# Patient Record
Sex: MALE | Race: WHITE | NOT HISPANIC OR LATINO | Employment: OTHER | URBAN - METROPOLITAN AREA
[De-identification: names, ages, dates, MRNs, and addresses within clinical notes are randomized per-mention and may not be internally consistent; named-entity substitution may affect disease eponyms.]

---

## 2017-10-19 ENCOUNTER — GENERIC CONVERSION - ENCOUNTER (OUTPATIENT)
Dept: OTHER | Facility: OTHER | Age: 67
End: 2017-10-19

## 2018-10-24 ENCOUNTER — OFFICE VISIT (OUTPATIENT)
Dept: DERMATOLOGY | Facility: CLINIC | Age: 68
End: 2018-10-24
Payer: COMMERCIAL

## 2018-10-24 DIAGNOSIS — Z13.89 SCREENING FOR SKIN CONDITION: ICD-10-CM

## 2018-10-24 DIAGNOSIS — L72.9 FOLLICULAR CYST OF SKIN: ICD-10-CM

## 2018-10-24 DIAGNOSIS — L40.9 PSORIASIS: ICD-10-CM

## 2018-10-24 DIAGNOSIS — L82.1 SEBORRHEIC KERATOSIS: Primary | ICD-10-CM

## 2018-10-24 DIAGNOSIS — Z85.820 HISTORY OF MELANOMA: Chronic | ICD-10-CM

## 2018-10-24 PROCEDURE — 99213 OFFICE O/P EST LOW 20 MIN: CPT | Performed by: DERMATOLOGY

## 2018-10-24 RX ORDER — SIMVASTATIN 10 MG
TABLET ORAL
COMMUNITY

## 2018-10-24 RX ORDER — ALCLOMETASONE DIPROPIONATE 0.5 MG/G
CREAM TOPICAL
Refills: 0 | COMMUNITY
Start: 2018-10-11 | End: 2018-10-24 | Stop reason: SDUPTHER

## 2018-10-24 RX ORDER — ALCLOMETASONE DIPROPIONATE 0.5 MG/G
CREAM TOPICAL 2 TIMES DAILY
Qty: 30 G | Refills: 1 | Status: SHIPPED | OUTPATIENT
Start: 2018-10-24 | End: 2019-10-29 | Stop reason: SDUPTHER

## 2018-10-24 RX ORDER — MULTIVIT-MIN/IRON FUM/FOLIC AC 7.5 MG-4
TABLET ORAL
COMMUNITY

## 2018-10-24 RX ORDER — TAMSULOSIN HYDROCHLORIDE 0.4 MG/1
0.8 CAPSULE ORAL DAILY
Refills: 0 | COMMUNITY
Start: 2018-09-20

## 2018-10-24 NOTE — PROGRESS NOTES
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 and treated with 2 excisions finally no problems noted over many years psoriasis noted previously treated with Aclovate with good control  In addition now patient complains of a lump on his back no other concerns noted  Past Medical History:   Diagnosis Date    Prostate CA ([facility] [address] )      No past surgical history on file  Social History   History   Alcohol use Not on file     History   Drug use: Unknown     History   Smoking Status    Former Smoker   Smokeless Tobacco    Never Used     No family history on file  Meds/Allergies   Allergies   Allergen Reactions    Penicillins        Meds:  Prior to Admission medications    Medication Sig Start Date End Date Taking?  Authorizing Provider   alclomethasone (ACLOVATE) 0 05 % cream  10/11/18  Yes Historical Provider, MD   Multiple Vitamins-Minerals (MULTIVITAMIN WITH MINERALS) tablet Take by mouth   Yes Historical Provider, MD   simvastatin (ZOCOR) 10 mg tablet Take by mouth   Yes Historical Provider, MD   tamsulosin (FLOMAX) 0 4 mg Take 0 8 mg by mouth daily 18  Yes Historical Provider, MD       Subjective:     Review of Systems:    General: negative for - chills, fatigue, fever,  weight gain or weight loss  Psychological: negative for - anxiety, behavioral disorder, concentration difficulties, decreased libido, depression, irritability, memory difficulties, mood swings, sleep disturbances or suicidal ideation  ENT: negative for - hearing difficulties , nasal congestion, nasal discharge, oral lesions, sinus pain, sneezing, sore throat  Allergy and Immunology: negative for - hives, insect bite sensitivity,  Hematological and Lymphatic: negative for - bleeding problems, blood clots,bruising, swollen lymph nodes  Endocrine: negative for - hair pattern changes, hot flashes, malaise/lethargy, mood swings, palpitations, polydipsia/polyuria, skin changes, temperature intolerance or unexpected weight change  Respiratory: negative for - cough, hemoptysis, orthopnea, shortness of breath, or wheezing  Cardiovascular: negative for - chest pain, dyspnea on exertion, edema,  Gastrointestinal: negative for - abdominal pain, nausea/vomiting  Genito-Urinary: negative for - dysuria, incontinence, irregular/heavy menses or urinary frequency/urgency  Musculoskeletal: negative for - gait disturbance, joint pain, joint stiffness, joint swelling, muscle pain, muscular weakness  Dermatological:  As in HPI  Neurological: negative for confusion, dizziness, headaches, impaired coordination/balance, memory loss, numbness/tingling, seizures, speech problems, tremors or weakness       Objective: There were no vitals taken for this visit  Physical Exam:    General Appearance:    Alert, cooperative, no distress   Head:    Normocephalic, without obvious abnormality, atraumatic           Skin:   A full skin exam was performed including scalp, head scalp, eyes, ears, nose, lips, neck, chest, axilla, abdomen, back, buttocks, bilateral upper extremities, bilateral lower extremities, hands, feet, fingers, toes, fingernails, and toenails previous site of skin cancer well healed on cheek without recurrence normal pigmented lesions regular shape and color normal keratotic papules greasy stuck on appearance 2 cm cystic nodule noted on the mid back erythematous scaling patches noted in the meatus of the ears eyebrows and a bit on the scalp nothing else remarkable noted on exam     Assessment:     1  Seborrheic keratosis     2  History of melanoma      lentigo maligna L cheek 2003-2007recurrence   3  Screening for skin condition     4  Psoriasis  alclomethasone (ACLOVATE) 0 05 % cream   5  Follicular cyst of skin           Plan:   Psoriasis under good control continue same therapy  Follicular cyst advised patient that this is from hair follicles that ballooned out and forms this type of growth  No treatment indicated unless patient so desires or if lesion enlarges or changes  Seborrheic keratosis patient reassured these are normal growths we acquire with age no treatment needed  History of melanoma no recurrence nothing else atypical sunblock recommended follow-up in 1 year  Screening for dermatologic disorders nothing else of concern noted on complete exam follow-up in 1 year      Gwenette Gosselin, MD  10/24/2018,10:15 AM    Portions of the record may have been created with voice recognition software   Occasional wrong word or "sound a like" substitutions may have occurred due to the inherent limitations of voice recognition software   Read the chart carefully and recognize, using context, where substitutions have occurred

## 2018-10-24 NOTE — PATIENT INSTRUCTIONS
Psoriasis under good control continue same therapy  Follicular cyst advised patient that this is from hair follicles that ballooned out and forms this type of growth  No treatment indicated unless patient so desires or if lesion enlarges or changes    Seborrheic keratosis patient reassured these are normal growths we acquire with age no treatment needed  History of melanoma no recurrence nothing else atypical sunblock recommended follow-up in 1 year  Screening for dermatologic disorders nothing else of concern noted on complete exam follow-up in 1 year

## 2019-10-29 ENCOUNTER — OFFICE VISIT (OUTPATIENT)
Dept: DERMATOLOGY | Facility: CLINIC | Age: 69
End: 2019-10-29
Payer: COMMERCIAL

## 2019-10-29 DIAGNOSIS — L72.9 FOLLICULAR CYST OF SKIN: ICD-10-CM

## 2019-10-29 DIAGNOSIS — Z13.89 SCREENING FOR SKIN CONDITION: ICD-10-CM

## 2019-10-29 DIAGNOSIS — L82.1 SEBORRHEIC KERATOSIS: Primary | ICD-10-CM

## 2019-10-29 DIAGNOSIS — Z85.820 HISTORY OF MELANOMA: ICD-10-CM

## 2019-10-29 DIAGNOSIS — L40.9 PSORIASIS: ICD-10-CM

## 2019-10-29 PROCEDURE — 99213 OFFICE O/P EST LOW 20 MIN: CPT | Performed by: DERMATOLOGY

## 2019-10-29 RX ORDER — ALCLOMETASONE DIPROPIONATE 0.5 MG/G
CREAM TOPICAL 2 TIMES DAILY
Qty: 30 G | Refills: 1 | Status: SHIPPED | OUTPATIENT
Start: 2019-10-29 | End: 2020-11-04 | Stop reason: SDUPTHER

## 2019-10-29 NOTE — PROGRESS NOTES
[address] [facility] DERMATOLOGY  [address] [place] [address]  [phone]  [phone]     MRN: [id number] : 1950  Encounter: [id number]  Patient Information: Donia Buerger  Chief complaint:  Yearly checkup    History of present illness:  26-year-old male with previous history of both melanoma nonmelanoma skin cancer and history of psoriasis presents for overall checkup  Patient also concerned regarding cyst on his back  Past Medical History:   Diagnosis Date    Prostate CA [facility])      History reviewed  No pertinent surgical history  Social History   Social History     Substance and Sexual Activity   Alcohol Use Not on file     Social History     Substance and Sexual Activity   Drug Use Not on file     Social History     Tobacco Use   Smoking Status Former Smoker   Smokeless Tobacco Never Used     History reviewed  No pertinent family history  Meds/Allergies   Allergies   Allergen Reactions    Bee Venom Anaphylaxis    Penicillins        Meds:  Prior to Admission medications    Medication Sig Start Date End Date Taking?  Authorizing Provider   alclomethasone (ACLOVATE) 0 05 % cream Apply topically 2 (two) times a day 10/24/18  Yes Laila Lopez MD   Multiple Vitamins-Minerals (MULTIVITAMIN WITH MINERALS) tablet Take by mouth   Yes Historical Provider, MD   simvastatin (ZOCOR) 10 mg tablet Take by mouth   Yes Historical Provider, MD   tamsulosin (FLOMAX) 0 4 mg Take 0 8 mg by mouth daily 18  Yes Historical Provider, MD       Subjective:     Review of Systems:    General: negative for - chills, fatigue, fever,  weight gain or weight loss  Psychological: negative for - anxiety, behavioral disorder, concentration difficulties, decreased libido, depression, irritability, memory difficulties, mood swings, sleep disturbances or suicidal ideation  ENT: negative for - hearing difficulties , nasal congestion, nasal discharge, oral lesions, sinus pain, sneezing, sore throat  Allergy and Immunology: negative for - hives, insect bite sensitivity,  Hematological and Lymphatic: negative for - bleeding problems, blood clots,bruising, swollen lymph nodes  Endocrine: negative for - hair pattern changes, hot flashes, malaise/lethargy, mood swings, palpitations, polydipsia/polyuria, skin changes, temperature intolerance or unexpected weight change  Respiratory: negative for - cough, hemoptysis, orthopnea, shortness of breath, or wheezing  Cardiovascular: negative for - chest pain, dyspnea on exertion, edema,  Gastrointestinal: negative for - abdominal pain, nausea/vomiting  Genito-Urinary: negative for - dysuria, incontinence, irregular/heavy menses or urinary frequency/urgency  Musculoskeletal: negative for - gait disturbance, joint pain, joint stiffness, joint swelling, muscle pain, muscular weakness  Dermatological:  As in HPI  Neurological: negative for confusion, dizziness, headaches, impaired coordination/balance, memory loss, numbness/tingling, seizures, speech problems, tremors or weakness       Objective: There were no vitals taken for this visit  Physical Exam:    General Appearance:    Alert, cooperative, no distress   Head:    Normocephalic, without obvious abnormality, atraumatic           Skin:   A full skin exam was performed including scalp, head scalp, eyes, ears, nose, lips, neck, chest, axilla, abdomen, back, buttocks, bilateral upper extremities, bilateral lower extremities, hands, feet, fingers, toes, fingernails, and toenails previous sites of skin cancer well healed without recurrence normal pigmented lesions with regular shape and color normal keratotic papules with greasy stuck on appearance 13 mm cystic nodule noted on the mid back nothing else remarkable noted on complete exam scaling erythematous patches noted on the forehead and left parietal scalp     Assessment:     1  Seborrheic keratosis     2  Screening for skin condition     3   History of melanoma     4  Psoriasis  alclomethasone (ACLOVATE) 0 05 % cream   5  Follicular cyst of skin           Plan: Follicular cyst advised patient that this is from hair follicles that ballooned out and forms this type of growth  No treatment indicated unless patient so desires or if lesion enlarges or changes  Psoriasis under control continue same treatment  History of melanoma no recurrence nothing else atypical continue to monitor closely if any new lesions  Seborrheic Keratosis  Patient reasurred these are normal growths we acquire with age no treatment needed  Screening for Dermatologic Disorders: Nothing else of concern noted on complete exam follow up in 1 year       Padmini Hoffman MD  10/29/2019,9:32 AM    Portions of the record may have been created with voice recognition software   Occasional wrong word or "sound a like" substitutions may have occurred due to the inherent limitations of voice recognition software   Read the chart carefully and recognize, using context, where substitutions have occurred

## 2019-10-29 NOTE — PATIENT INSTRUCTIONS
Follicular cyst advised patient that this is from hair follicles that ballooned out and forms this type of growth  No treatment indicated unless patient so desires or if lesion enlarges or changes  Psoriasis under control continue same treatment  History of melanoma no recurrence nothing else atypical continue to monitor closely if any new lesions  Seborrheic Keratosis  Patient reasurred these are normal growths we acquire with age no treatment needed    Screening for Dermatologic Disorders: Nothing else of concern noted on complete exam follow up in 1 year

## 2019-12-04 ENCOUNTER — PROCEDURE VISIT (OUTPATIENT)
Dept: DERMATOLOGY | Facility: CLINIC | Age: 69
End: 2019-12-04
Payer: COMMERCIAL

## 2019-12-04 DIAGNOSIS — L72.9 FOLLICULAR CYST OF SKIN: Primary | ICD-10-CM

## 2019-12-04 PROCEDURE — 88304 TISSUE EXAM BY PATHOLOGIST: CPT | Performed by: STUDENT IN AN ORGANIZED HEALTH CARE EDUCATION/TRAINING PROGRAM

## 2019-12-04 PROCEDURE — 12031 INTMD RPR S/A/T/EXT 2.5 CM/<: CPT | Performed by: DERMATOLOGY

## 2019-12-04 PROCEDURE — 11402 EXC TR-EXT B9+MARG 1.1-2 CM: CPT | Performed by: DERMATOLOGY

## 2019-12-04 NOTE — PROGRESS NOTES
[address]  [address] [facility] [address]  [phone]  [phone]     MRN: [id number] : 1950  Encounter: [id number]  Patient Information: Heidi Schaffer    Subjective:     66-year-old male presents for follow-up secondary to planned excision of the cyst noted on the mid lower back     Objective: There were no vitals taken for this visit  Physical Exam:    General Appearance:    Alert, cooperative, no distress   Skin:   Previously noted 13 mm cyst mid lower back  Procedure name:  Simple Excision with primary closure of cyst        Location:  Mid lower back    Diagnosis:  Follicular cyst    Size of lesion: 1 3cm      I explained the diagnosis to the patient and we recommend an excision of the lesion for diagnosis and/or treatment  Potential complications include, but are not limited to: scarring, bleeding, infection, incomplete removal, recurrence, and nerve damage  The risks, benefits, and alternatives were discussed with the patient in detail  The location of the tumor was identified, circled, and confirmed by the patient  The correct patient, site, and procedure were confirmed  Anesthesia: 1% xylocaine with 1:100,000 epinephrine 3  cc  The patient was brought into the room, prepped and draped sterilely in the usual manner and anesthesia was administered by local infiltration  A linear incision was drawn across the lesion, and the area was incised to the cyst wall with a number 15c Bard-Familia blade  The cyst was removed with sharp and blunt dissection  Hemostasis was achieved with cautery  A primary closure was obtained The wound was cleaned with hydrogen peroxide, dried off, petroleum applied, and the wound was covered with a pressure dressing  The patient was given detailed verbal and written instructions on postoperative care        Suture for adipose/fascial/dermal layer closure: 4-0  Vicryl 2 sutures    Suture used to approximate epidermis: 4-0  nylon 3 sutures    Final wound length: 1 3 cm    Follow-up in: 2 weeks  Assessment:     1  Follicular cyst of skin           Plan:   Wound care instructions given to patient        Prior to Admission medications    Medication Sig Start Date End Date Taking? Authorizing Provider   alclomethasone (ACLOVATE) 0 05 % cream Apply topically 2 (two) times a day 10/29/19  Yes Irene Sinclair MD   Multiple Vitamins-Minerals (MULTIVITAMIN WITH MINERALS) tablet Take by mouth   Yes Historical Provider, MD   simvastatin (ZOCOR) 10 mg tablet Take by mouth   Yes Historical Provider, MD   tamsulosin (FLOMAX) 0 4 mg Take 0 8 mg by mouth daily 9/20/18  Yes Historical Provider, MD     Allergies   Allergen Reactions    Bee Venom Anaphylaxis    Penicillins        Irene Sinclair MD  12/4/2019,10:09 AM    Portions of the record may have been created with voice recognition software   Occasional wrong word or "sound a like" substitutions may have occurred due to the inherent limitations of voice recognition software   Read the chart carefully and recognize, using context, where substitutions have occurred

## 2019-12-16 ENCOUNTER — TELEPHONE (OUTPATIENT)
Dept: DERMATOLOGY | Facility: CLINIC | Age: 69
End: 2019-12-16

## 2019-12-16 NOTE — TELEPHONE ENCOUNTER
----- Message from Irene Sinclair MD sent at 12/13/2019  8:01 AM EST -----  Please call him of normal pathology

## 2019-12-18 ENCOUNTER — OFFICE VISIT (OUTPATIENT)
Dept: DERMATOLOGY | Facility: CLINIC | Age: 69
End: 2019-12-18

## 2019-12-18 DIAGNOSIS — L72.9 FOLLICULAR CYST OF SKIN: Primary | ICD-10-CM

## 2019-12-18 PROCEDURE — 99024 POSTOP FOLLOW-UP VISIT: CPT | Performed by: DERMATOLOGY

## 2019-12-18 NOTE — PROGRESS NOTES
[address]  [address] [facility] [address]  [phone]  [phone]     MRN: [id number] : 1950  Encounter: [id number]  Patient Information: Dorien Eisenmenger    Subjective:     64-year-old male presents for follow-up for previously excised follicular cyst mid lower back no problems noted     Objective: There were no vitals taken for this visit  Physical Exam:    General Appearance:    Alert, cooperative, no distress   Skin:   Previous site of excision healing well  Procedure:  Suture removal  Site of excision:  Lower back  Wound was cleansed with saline  Wound is intact  Sutures removed  Steri-Strips applied  Biopsy revealed normal cyst     Assessment:     1  Follicular cyst of skin           Plan:   Wound care instructions given to patient        Prior to Admission medications    Medication Sig Start Date End Date Taking? Authorizing Provider   alclomethasone (ACLOVATE) 0 05 % cream Apply topically 2 (two) times a day 10/29/19   Marlee Kapadia MD   Multiple Vitamins-Minerals (MULTIVITAMIN WITH MINERALS) tablet Take by mouth    Historical Provider, MD   simvastatin (ZOCOR) 10 mg tablet Take by mouth    Historical Provider, MD   tamsulosin (FLOMAX) 0 4 mg Take 0 8 mg by mouth daily 18   Historical Provider, MD     Allergies   Allergen Reactions    Bee Venom Anaphylaxis    Penicillins        Marlee Kapadia MD  2019,8:29 AM    Portions of the record may have been created with voice recognition software   Occasional wrong word or "sound a like" substitutions may have occurred due to the inherent limitations of voice recognition software   Read the chart carefully and recognize, using context, where substitutions have occurred

## 2019-12-18 NOTE — PATIENT INSTRUCTIONS
Wound care instructions given to patient  STERI-STRIPS (BUTTERFLY) POST SURGERY        Steri-Strips have been placed over your incision site to give added support  Please continue to bathe the area as usual     Eventually the Steri-Strips will begin to peel off on the ends  Snip the raised ends off with scissors and let the rest fall off on their own  If you have any questions, please call our office   [phone]

## 2020-11-04 ENCOUNTER — OFFICE VISIT (OUTPATIENT)
Dept: DERMATOLOGY | Facility: CLINIC | Age: 70
End: 2020-11-04
Payer: COMMERCIAL

## 2020-11-04 VITALS — TEMPERATURE: 98.6 F

## 2020-11-04 DIAGNOSIS — L40.9 PSORIASIS: Primary | ICD-10-CM

## 2020-11-04 DIAGNOSIS — Z85.820 HISTORY OF MELANOMA: ICD-10-CM

## 2020-11-04 DIAGNOSIS — D22.9 NEVUS: ICD-10-CM

## 2020-11-04 DIAGNOSIS — L82.1 SEBORRHEIC KERATOSIS: ICD-10-CM

## 2020-11-04 DIAGNOSIS — Z13.89 SCREENING FOR SKIN CONDITION: ICD-10-CM

## 2020-11-04 PROCEDURE — 99214 OFFICE O/P EST MOD 30 MIN: CPT | Performed by: DERMATOLOGY

## 2020-11-04 RX ORDER — KETOCONAZOLE 20 MG/ML
1 SHAMPOO TOPICAL 2 TIMES WEEKLY
Qty: 120 ML | Refills: 3 | Status: SHIPPED | OUTPATIENT
Start: 2020-11-05 | End: 2022-08-02 | Stop reason: SDUPTHER

## 2020-11-04 RX ORDER — ALCLOMETASONE DIPROPIONATE 0.5 MG/G
CREAM TOPICAL 2 TIMES DAILY
Qty: 30 G | Refills: 1 | Status: SHIPPED | OUTPATIENT
Start: 2020-11-04

## 2021-11-22 ENCOUNTER — OFFICE VISIT (OUTPATIENT)
Dept: DERMATOLOGY | Facility: CLINIC | Age: 71
End: 2021-11-22
Payer: COMMERCIAL

## 2021-11-22 DIAGNOSIS — L98.9 UNKNOWN SKIN LESION: Primary | ICD-10-CM

## 2021-11-22 DIAGNOSIS — L82.1 SEBORRHEIC KERATOSIS: ICD-10-CM

## 2021-11-22 DIAGNOSIS — Z85.820 HISTORY OF MELANOMA: ICD-10-CM

## 2021-11-22 DIAGNOSIS — Z13.89 SCREENING FOR SKIN CONDITION: ICD-10-CM

## 2021-11-22 PROCEDURE — 11102 TANGNTL BX SKIN SINGLE LES: CPT | Performed by: DERMATOLOGY

## 2021-11-22 PROCEDURE — 88305 TISSUE EXAM BY PATHOLOGIST: CPT | Performed by: PATHOLOGY

## 2021-11-22 PROCEDURE — 99213 OFFICE O/P EST LOW 20 MIN: CPT | Performed by: DERMATOLOGY

## 2021-11-22 RX ORDER — NAPROXEN SODIUM 220 MG
220 TABLET ORAL
COMMUNITY

## 2021-11-22 RX ORDER — AMLODIPINE BESYLATE 5 MG/1
5 TABLET ORAL DAILY
COMMUNITY
Start: 2021-11-19

## 2021-11-22 RX ORDER — MOMETASONE FUROATE AND FORMOTEROL FUMARATE DIHYDRATE 200; 5 UG/1; UG/1
AEROSOL RESPIRATORY (INHALATION)
COMMUNITY
Start: 2021-10-04

## 2021-11-30 ENCOUNTER — TELEPHONE (OUTPATIENT)
Dept: DERMATOLOGY | Facility: CLINIC | Age: 71
End: 2021-11-30

## 2022-08-02 ENCOUNTER — OFFICE VISIT (OUTPATIENT)
Dept: DERMATOLOGY | Facility: CLINIC | Age: 72
End: 2022-08-02
Payer: COMMERCIAL

## 2022-08-02 VITALS — BODY MASS INDEX: 24.11 KG/M2 | WEIGHT: 198 LBS | HEIGHT: 76 IN

## 2022-08-02 DIAGNOSIS — L82.1 SEBORRHEIC KERATOSIS: ICD-10-CM

## 2022-08-02 DIAGNOSIS — Z13.89 SCREENING FOR SKIN CONDITION: ICD-10-CM

## 2022-08-02 DIAGNOSIS — L40.9 PSORIASIS: ICD-10-CM

## 2022-08-02 DIAGNOSIS — D22.9 NEVUS: Primary | ICD-10-CM

## 2022-08-02 DIAGNOSIS — Z85.820 HISTORY OF MELANOMA: ICD-10-CM

## 2022-08-02 PROCEDURE — 99213 OFFICE O/P EST LOW 20 MIN: CPT | Performed by: DERMATOLOGY

## 2022-08-02 RX ORDER — KETOCONAZOLE 20 MG/ML
1 SHAMPOO TOPICAL 2 TIMES WEEKLY
Qty: 120 ML | Refills: 3 | Status: SHIPPED | OUTPATIENT
Start: 2022-08-04

## 2022-08-02 NOTE — PROGRESS NOTES
[address]  [address] [facility] [address]  [phone]  [phone]     MRN: [id number] : 1950  Encounter: [id number]  Patient Information: Victor Hugo Hickey  Chief complaint:  Six-month checkup    History of present illness:  22-year-old male presents for overall skin check previous history of melanoma in-situ removed in  no specific concerns today patient with history of psoriasis scalp treated with ketoconazole shampoo  Past Medical History:   Diagnosis Date    Prostate CA [facility])      History reviewed  No pertinent surgical history  Social History   Social History     Substance and Sexual Activity   Alcohol Use None     Social History     Substance and Sexual Activity   Drug Use Not on file     Social History     Tobacco Use   Smoking Status Former Smoker   Smokeless Tobacco Never Used     History reviewed  No pertinent family history  Meds/Allergies   Allergies   Allergen Reactions    Bee Venom Anaphylaxis    Penicillins        Meds:  Prior to Admission medications    Medication Sig Start Date End Date Taking?  Authorizing Provider   amLODIPine (NORVASC) 5 mg tablet Take 5 mg by mouth daily 21  Yes Historical Provider, MD   [facility] 200-5 MCG/ACT inhaler  10/4/21  Yes Historical Provider, MD   ketoconazole (NIZORAL) 2 % shampoo Apply 1 application topically 2 (two) times a week 20  Yes Clare Rodriguez MD   Multiple Vitamins-Minerals (MULTIVITAMIN WITH MINERALS) tablet Take by mouth   Yes Historical Provider, MD   naproxen sodium (ALEVE) 220 MG tablet Take 220 mg by mouth   Yes Historical Provider, MD   simvastatin (ZOCOR) 10 mg tablet Take by mouth   Yes Historical Provider, MD   tamsulosin (FLOMAX) 0 4 mg Take 0 8 mg by mouth daily 18  Yes Historical Provider, MD   alclomethasone (ACLOVATE) 0 05 % cream Apply topically 2 (two) times a day  Patient not taking: No sig reported 20   Clare Rodriguez MD       Subjective: Review of Systems:    General: negative for - chills, fatigue, fever,  weight gain or weight loss  Psychological: negative for - anxiety, behavioral disorder, concentration difficulties, decreased libido, depression, irritability, memory difficulties, mood swings, sleep disturbances or suicidal ideation  ENT: negative for - hearing difficulties , nasal congestion, nasal discharge, oral lesions, sinus pain, sneezing, sore throat  Allergy and Immunology: negative for - hives, insect bite sensitivity,  Hematological and Lymphatic: negative for - bleeding problems, blood clots,bruising, swollen lymph nodes  Endocrine: negative for - hair pattern changes, hot flashes, malaise/lethargy, mood swings, palpitations, polydipsia/polyuria, skin changes, temperature intolerance or unexpected weight change  Respiratory: negative for - cough, hemoptysis, orthopnea, shortness of breath, or wheezing  Cardiovascular: negative for - chest pain, dyspnea on exertion, edema,  Gastrointestinal: negative for - abdominal pain, nausea/vomiting  Genito-Urinary: negative for - dysuria, incontinence, irregular/heavy menses or urinary frequency/urgency  Musculoskeletal: negative for - gait disturbance, joint pain, joint stiffness, joint swelling, muscle pain, muscular weakness  Dermatological:  As in HPI  Neurological: negative for confusion, dizziness, headaches, impaired coordination/balance, memory loss, numbness/tingling, seizures, speech problems, tremors or weakness       Objective:   Ht 6' 4" (1 93 m)   Wt 89 8 kg (198 lb)   BMI 24 10 kg/m²     Physical Exam:    General Appearance:    Alert, cooperative, no distress   Head:    Normocephalic, without obvious abnormality, atraumatic           Skin:   A full skin exam was performed including scalp, head scalp, eyes, ears, nose, lips, neck, chest, axilla, abdomen, back, buttocks, bilateral upper extremities, bilateral lower extremities, hands, feet, fingers, toes, fingernails, and toenails normal pigmented lesion regular shape color normal keratotic papules greasy stuck appearance nothing else of concern noted on complete exam     Assessment:     1  Nevus     2  Psoriasis  ketoconazole (NIZORAL) 2 % shampoo   3  Seborrheic keratosis     4  Screening for skin condition     5  History of melanoma           Plan:   Nevi reviewed the concept of ABCDE and ugly duckling nothing markedly atypical patient reassured  Psoriasis under good control continue same therapy  Seborrheic keratosis patient reassured these are normal growths we acquire with age no treatment needed  History of skin cancer in no recurrence nothing else atypical sunblock recommended follow-up in 1 year  Screening for dermatologic disorders nothing else of concern noted on complete exam follow-up in 1 year    Soraya Martel MD  8/2/2022,4:13 PM    Portions of the record may have been created with voice recognition software   Occasional wrong word or "sound a like" substitutions may have occurred due to the inherent limitations of voice recognition software   Read the chart carefully and recognize, using context, where substitutions have occurred

## 2022-08-02 NOTE — PATIENT INSTRUCTIONS
Nevi reviewed the concept of ABCDE and ugly duckling nothing markedly atypical patient reassured  Psoriasis under good control continue same therapy  Seborrheic keratosis patient reassured these are normal growths we acquire with age no treatment needed  History of skin cancer in no recurrence nothing else atypical sunblock recommended follow-up in 1 year  Screening for dermatologic disorders nothing else of concern noted on complete exam follow-up in 1 year

## 2022-10-06 ENCOUNTER — APPOINTMENT (OUTPATIENT)
Dept: RADIOLOGY | Facility: CLINIC | Age: 72
End: 2022-10-06
Payer: OTHER MISCELLANEOUS

## 2022-10-06 ENCOUNTER — OCCMED (OUTPATIENT)
Dept: URGENT CARE | Facility: CLINIC | Age: 72
End: 2022-10-06
Payer: OTHER MISCELLANEOUS

## 2022-10-06 DIAGNOSIS — S80.02XA CONTUSION OF LEFT KNEE, INITIAL ENCOUNTER: ICD-10-CM

## 2022-10-06 DIAGNOSIS — S80.02XA CONTUSION OF LEFT KNEE, INITIAL ENCOUNTER: Primary | ICD-10-CM

## 2022-10-06 PROCEDURE — 73564 X-RAY EXAM KNEE 4 OR MORE: CPT

## 2022-10-06 PROCEDURE — 99283 EMERGENCY DEPT VISIT LOW MDM: CPT | Performed by: PHYSICIAN ASSISTANT

## 2022-10-06 PROCEDURE — G0382 LEV 3 HOSP TYPE B ED VISIT: HCPCS | Performed by: PHYSICIAN ASSISTANT

## 2023-08-08 ENCOUNTER — OFFICE VISIT (OUTPATIENT)
Age: 73
End: 2023-08-08
Payer: MEDICARE

## 2023-08-08 VITALS — BODY MASS INDEX: 25.57 KG/M2 | HEIGHT: 76 IN | WEIGHT: 210 LBS

## 2023-08-08 DIAGNOSIS — L82.1 SEBORRHEIC KERATOSIS: ICD-10-CM

## 2023-08-08 DIAGNOSIS — D22.9 NEVUS: ICD-10-CM

## 2023-08-08 DIAGNOSIS — Z13.89 SCREENING FOR SKIN CONDITION: Primary | ICD-10-CM

## 2023-08-08 DIAGNOSIS — D18.01 CHERRY ANGIOMA: ICD-10-CM

## 2023-08-08 DIAGNOSIS — Z85.820 HISTORY OF MELANOMA: ICD-10-CM

## 2023-08-08 PROCEDURE — 99213 OFFICE O/P EST LOW 20 MIN: CPT | Performed by: DERMATOLOGY

## 2023-08-08 NOTE — PATIENT INSTRUCTIONS
MELANOCYTIC NEVI ("Moles")      Melanocytic nevi ("moles") are tan or brown, raised or flat areas of the skin which have an increased number of melanocytes. Melanocytes are the cells in our body which make pigment and account for skin color. Some moles are present at birth (I.e., "congenital nevi"), while others come up later in life (i.e., "acquired nevi"). The sun can stimulate the body to make more moles. Sunburns are not the only thing that triggers more moles. Chronic sun exposure can do it too. Clinically distinguishing a healthy mole from melanoma may be difficult, even for experienced dermatologists. The "ABCDE's" of moles have been suggested as a means of helping to alert a person to a suspicious mole and the possible increased risk of melanoma. The suggestions for raising alert are as follows:    Asymmetry: Healthy moles tend to be symmetric, while melanomas are often asymmetric. Asymmetry means if you draw a line through the mole, the two halves do not match in color, size, shape, or surface texture. Asymmetry can be a result of rapid enlargement of a mole, the development of a raised area on a previously flat lesion, scaling, ulceration, bleeding or scabbing within the mole. Any mole that starts to demonstrate "asymmetry" should be examined promptly by a board certified dermatologist.     Border: Healthy moles tend to have discrete, even borders. The border of a melanoma often blends into the normal skin and does not sharply delineate the mole from normal skin. Any mole that starts to demonstrate "uneven borders" should be examined promptly by a board certified dermatologist.     Color: Healthy moles tend to be one color throughout. Melanomas tend to be made up of different colors ranging from dark black, blue, white, or red.   Any mole that demonstrates a color change should be examined promptly by a board certified dermatologist.     Diameter: Healthy moles tend to be smaller than 0.6 cm in size; an exception are "congenital nevi" that can be larger. Melanomas tend to grow and can often be greater than 0.6 cm (1/4 of an inch, or the size of a pencil eraser). This is only a guideline, and many normal moles may be larger than 0.6 cm without being unhealthy. Any mole that starts to change in size (small to bigger or bigger to smaller) should be examined promptly by a board certified dermatologist.     Evolving: Healthy moles tend to "stay the same."  Melanomas may often show signs of change or evolution such as a change in size, shape, color, or elevation. Any mole that starts to itch, bleed, crust, burn, hurt, or ulcerate or demonstrate a change or evolution should be examined promptly by a board certified dermatologist.      Dysplastic Nevi  Dysplastic moles are moles that fit the ABCDE rules of melanoma but are not identified as melanomas when examined under the microscope. They may indicate an increased risk of melanoma in that person. If there is a family history of melanoma, most experts agree that the person may be at an increased risk for developing a melanoma. Experts still do not agree on what dysplastic moles mean in patients without a personal or family history of melanoma. Dysplastic moles are usually larger than common moles and have different colors within it with irregular borders. The appearance can be very similar to a melanoma. Biopsies of dysplastic moles may show abnormalities which are different from a regular mole. Melanoma  Malignant melanoma is a type of skin cancer that can be deadly if it spreads throughout the body. The incidence of melanoma in the [facility] is growing faster than any other cancer. Melanoma usually grows near the surface of the skin for a period of time, and then begins to grow deeper into the skin. Once it grows deeper into the skin, the risk of spread to other organs greatly increases.  Therefore, early detection and removal of a malignant melanoma may result in a better chance at a complete cure; removal after the tumor has spread may not be as effective, leading to worse clinical outcomes such as death. The true rate of nevus transformation into a melanoma is unknown. It has been estimated that the lifetime risk for any acquired melanocytic nevus on any 21year-old individual transforming into melanoma by age 80 is 0.03% (1 in 3,164) for men and 0.009% (1 in 10,800) for women. The appearance of a "new mole" remains one of the most reliable methods for identifying a malignant melanoma. Occasionally, melanomas appear as rapidly growing, blue-black, dome-shaped bumps within a previous mole or previous area of normal skin. Other times, melanomas are suspected when a mole suddenly appears or changes. Itching, burning, or pain in a pigmented lesion should increase suspicion, but most patients with early melanoma have no skin discomfort whatsoever. Melanoma can occur anywhere on the skin, including areas that are difficult for self-examination. Many melanomas are first noticed by other family members. Suspicious-looking moles may be removed for microscopic examination. You may be able to prevent death from melanoma by doing two simple things:    Try to avoid unnecessary sun exposure and protect your skin when it is exposed to the sun. People who live near the equator, people who have intermittent exposures to large amounts of sun, and people who have had sunburns in childhood or adolescence have an increased risk for melanoma. Sun sense and vigilant sun protection may be keys to helping to prevent melanoma. We recommend wearing UPF-rated sun protective clothing and sunglasses whenever possible and applying a moisturizer-sunscreen combination product (SPF 50+) such as Neutrogena Daily Defense to sun exposed areas of skin at least three times a day.     Have your moles regularly examined by a board certified dermatologist AND by yourself or a family member/friend at home. We recommend that you have your moles examined at least once a year by a board certified dermatologist.  Use your birthday as an annual reminder to have your "Birthday Suit" (I.e., your skin) examined; it is a nice birthday gift to yourself to know that your skin is healthy appearing! Additionally, at-home self examinations may be helpful for detecting a possible melanoma. Use the ABCDEs we discussed and check your moles once a month at home. SEBORRHEIC KERATOSIS  A seborrheic keratosis is a harmless warty spot that appears during adult life as a common sign of skin aging. Seborrheic keratoses can arise on any area of skin, covered or uncovered, with the usual exception of the palms and soles. They do not arise from mucous membranes. Seborrheic keratoses can have highly variable appearance. Seborrheic keratoses are extremely common. It has been estimated that over 90% of adults over the age of 61 years have one or more of them. They occur in males and females of all races, typically beginning to erupt in the 35s or 45s. They are uncommon under the age of 21 years. The precise cause of seborrhoeic keratoses is not known. Seborrhoeic keratoses are considered degenerative in nature. As time goes by, seborrheic keratoses tend to become more numerous. Some people inherit a tendency to develop a very large number of them; some people may have hundreds of them. The name "seborrheic keratosis" is misleading, because these lesions are not limited to a seborrhoeic distribution (scalp, mid-face, chest, upper back), nor are they formed from sebaceous glands, nor are they associated with sebum -- which is greasy. Seborrheic keratosis may also be called "SK," "Seb K," "basal cell papilloma," "senile wart," or "barnacle."      There is no easy way to remove multiple lesions on a single occasion.   Unless a specific lesion is "inflamed" and is causing pain or stinging/burning or is bleeding, most insurance companies do not authorize treatment. ANGIOMA ("CHERRY ANGIOMA")  Lopez angiomas markedly increase in number from about the age of 36, so it has been estimated that 75% of people over 76years of age have them. Although they also called "senile angiomas," they can occur in young people too - 5% of adolescents have been found to have them. Cherry angiomas are very common in males and females of any age or race, with no difference in sexes or races affected. They are however more noticeable in white skin than in skin of colour. There may be a family history of similar lesions. Eruptive (very large number appearing in a short period of time) cherry angiomas have been rarely reported to be associated with internal malignancy and pregnancy.

## 2023-08-08 NOTE — PROGRESS NOTES
[facility] Note     Patient Name: Mia Reilly  Encounter Date: 8/8/2023     Have you been cared for by a [facility]atologist in the last 3 years and, if so, which description applies to you? Yes. I have been here within the last 3 years, and my medical history has NOT changed since that time. I am MALE/not capable of bearing children. REVIEW OF SYSTEMS:  Have you recently had or currently have any of the following? · No changes in my recent health. PAST MEDICAL HISTORY:  Have you personally ever had or currently have any of the following? If "YES," then please provide more detail. · No changes in my medical history. FAMILY HISTORY:  Any "first degree relatives" (parent, brother, sister, or child) with the following? • No changes in my family's known health. PATIENT EXPERIENCE:    • Do you want the Dermatologist to perform a COMPLETE skin exam today including a clinical examination under the "bra and underwear" areas? Yes  • If necessary, do we have your permission to call and leave a detailed message on your Preferred Phone number that includes your specific medical information?   Yes      Allergies   Allergen Reactions   • Bee Venom Anaphylaxis   • Penicillins       Current Outpatient Medications:   •  amLODIPine (NORVASC) 5 mg tablet, Take 5 mg by mouth daily, Disp: , Rfl:   •  Dulera 200-5 MCG/ACT inhaler, , Disp: , Rfl:   •  ketoconazole (NIZORAL) 2 % shampoo, Apply 1 application topically 2 (two) times a week, Disp: 120 mL, Rfl: 3  •  Multiple Vitamins-Minerals (MULTIVITAMIN WITH MINERALS) tablet, Take by mouth, Disp: , Rfl:   •  simvastatin (ZOCOR) 10 mg tablet, Take by mouth, Disp: , Rfl:           • Whom besides the patient is providing clinical information about today's encounter?   o NO ADDITIONAL HISTORIAN (patient alone provided history)     74year old male with history of Melanoma presents for a yearly skin exam. Patient has a spot of concern in right chest area.    Physical Exam and Assessment/Plan by Diagnosis:    HISTORY OF MELANOMA    Physical Exam:  • Anatomic Location Affected:  Left cheek, removed in 2007  • Morphological Description of Scar:  Scar well healed  • Year Treated: 2007  • Suspected Recurrence: no  • Regional adenopathy: no    Additional History of Present Condition:  Patient with history of Melanoma with no signs of recurrence    Assessment and Plan:  Based on a thorough discussion of this condition and the management approach to it (including a comprehensive discussion of the known risks, side effects and potential benefits of treatment), the patient (family) agrees to implement the following specific plan:  • Recommend routine skin exams every year   • Sun avoidance, protective clothing (known as UPF clothing), and the use of at least SPF 30 sunscreens is advised. Sunscreen should be reapplied every two hours when outside. What happens at follow-up? The main purpose of follow-up is to detect recurrences early (metastatic melanoma), but it also offers an opportunity to diagnose a new primary melanoma at the first possible opportunity. A second invasive melanoma occurs in 5-10% of melanoma patients and a new melanoma in situ is diagnosed in more than 20% of melanoma patients. Our practice makes the following recommendations for follow-up for patients with invasive melanoma.   • At-least "monthly" self-skin examinations   • Routine skin checks by a board certified dermatologist  • Follow-up intervals are "every 3 months" within 2 years of a new melanoma diagnosis; "every 6 months" between 2-4 years of a new melanoma diagnosis; and "annually" after 4 years of a new melanoma diagnosis  • Individual patient's needs should be considered before an appropriate follow-up is offered  • Provide education and support to help the patient adjust to their illness    Follow-up appointments should include:  • A check of the scar where the primary melanoma was removed  • Checking the regional lymph nodes  • A general skin examination  • A full physical examination at least annually by your primary care physician    In those with more advanced primary disease, follow-up may include:  • Blood tests  • Imaging: ultrasound, X-ray, CT, MRI and PET scan. Most tests are not worthwhile for patients with stage 1 or 2 melanoma unless there are signs or symptoms of disease recurrence or metastasis. No tests are necessary for healthy patients who have remained well for five years or longer after removal of their melanoma. What is the outlook for patients with melanoma? • Melanoma in situ is cured by excision because it has no potential to spread around the body. • The risk of spread and ultimate death from invasive melanoma depends on several factors, but the main one is the Breslow thickness of the melanoma at the time it was surgically removed. • Metastases are rare for melanomas < 0.75 mm and the risk for tumours 0.75-1 mm thick is about 5%. The risk steadily increases with thickness so that melanomas > 4 mm have a risk of metastasis of about 40%. Melanoma is a potentially serious type of skin cancer, in which there is uncontrolled growth of melanocytes (pigment cells). Melanoma is sometimes called malignant melanoma. Normal melanocytes are found in the basal layer of the epidermis (the outer layer of skin). Melanocytes produce a protein called melanin, which protects skin cells by absorbing ultraviolet (UV) radiation. Melanocytes are found in equal numbers in black and white skin, but melanocytes in black skin produce much more melanin. People with dark brown or black skin are very much less likely to be damaged by UV radiation than those with white skin.     MELANOCYTIC NEVI ("Moles")    Physical Exam:  • Anatomic Location Affected: Mostly on sun-exposed areas of the head, face, neck and arms  • Morphological Description:  Scattered, 1-4mm round to ovoid, symmetrical-appearing, even bordered, skin colored to dark brown macules/papules, mostly in sun-exposed areas    Additional History of Present Condition:  Present on exam.     Assessment and Plan:  Based on a thorough discussion of this condition and the management approach to it (including a comprehensive discussion of the known risks, side effects and potential benefits of treatment), the patient (family) agrees to implement the following specific plan:  • Provided handout with information regarding the ABCDE's of moles   • Recommend routine skin exams every year   • Sun avoidance, protective clothing (known as UPF clothing), and the use of at least SPF 30 sunscreens is advised. Sunscreen should be reapplied every two hours when outside. SEBORRHEIC KERATOSIS; NON-INFLAMED     Physical Exam:  • Anatomic Location Affected: In right chest area and across trunk, extremities,  face  • Morphological Description:  Flat and raised, waxy, smooth to warty textured, yellow to brownish-grey to dark brown to blackish, discrete, "stuck-on" appearing papules. Additional History of Present Condition:  Patient reports new bumps on the skin. Denies itch, burn, pain, bleeding or ulceration. Present constantly; nothing seems to make it worse or better. No prior treatment.       Assessment and Plan:  Based on a thorough discussion of this condition and the management approach to it (including a comprehensive discussion of the known risks, side effects and potential benefits of treatment), the patient (family) agrees to implement the following specific plan:  • Reassured benign      ANGIOMA ("CHERRY ANGIOMA")    Physical Exam:  • Anatomic Location: scattered across sun exposed areas of the trunk and extremities   • Morphologic Description: Firm red to reddish-blue discrete papules    Additional History of Present Condition:  Present on exam.     Assessment and Plan:  • Reassured benign        Scribe Attestation    I,: Amparo Henderson am acting as a scribe while in the presence of the attending physician.:       I,:  Blake Singer MD personally performed the services described in this documentation    as scribed in my presence.: